# Patient Record
Sex: FEMALE | Employment: OTHER | URBAN - METROPOLITAN AREA
[De-identification: names, ages, dates, MRNs, and addresses within clinical notes are randomized per-mention and may not be internally consistent; named-entity substitution may affect disease eponyms.]

---

## 2024-07-18 ENCOUNTER — OFFICE VISIT (OUTPATIENT)
Dept: ORTHOPEDIC SURGERY | Age: 59
End: 2024-07-18

## 2024-07-18 VITALS — BODY MASS INDEX: 33.34 KG/M2 | WEIGHT: 220 LBS | HEIGHT: 68 IN

## 2024-07-18 DIAGNOSIS — S99.912S LEFT ANKLE INJURY, SEQUELA: Primary | ICD-10-CM

## 2024-07-18 DIAGNOSIS — S96.912A SPRAIN AND STRAIN OF LEFT ANKLE: ICD-10-CM

## 2024-07-18 DIAGNOSIS — S93.409A GRADE 1 ANKLE SPRAIN: ICD-10-CM

## 2024-07-18 DIAGNOSIS — S93.402A SPRAIN AND STRAIN OF LEFT ANKLE: ICD-10-CM

## 2024-07-18 NOTE — PROGRESS NOTES
Chief Complaint:   Chief Complaint   Patient presents with    Ankle Pain     Left Ankle -2nd opinion. Went to ED [facility]. Wearing heels and rolled her ankle on June 8th. She has been wearing a boot. Aches when walking without boot. Can't get on tip toes or sit [place] style.          History of Present Illness:       Patient is a 59 y.o. female presents with the above complaint. The symptoms began  June 8, 2024   and started without an injury related to an inversion mechanism injury while wearing heels.  She was unable to weight-bear thereafter and presented to the emergency room was treated and released and presents here for further evaluation.  She was initially placed in a brace and has since weaned from this.    The patient describes a aching pain that does not radiate.  The symptoms are intermittent  and are are improving since the onset.    She estimates 50 to 60% improvement overall     Pain localizes to the Lateral aspect and  is not predictably aggravated by weightbearing. There are not mechanical symptoms associated with the symptoms. There are notneuritic symptoms involving the foot or ankle. The patient admits to subjective instability about the foot or ankle and admits to new onset or progressive weakness of the lower extremity.    Pain level 5    The patient admits to a pattern of activity related swelling.      Treatment to date:Bracing walking boot: with good improvement    There is no no prior history of foot or ankle trauma.    There is no prior history of autoimmune disease, crystal arthropathy, or crystal arthropathy.      Past Medical History:      No past medical history on file.    No past surgical history on file.      Present Medications:         No current outpatient medications on file.     No current facility-administered medications for this visit.         Allergies:        Allergies   Allergen Reactions    Latex Itching and Rash    Hydrocodone-Acetaminophen Nausea Only